# Patient Record
Sex: MALE
[De-identification: names, ages, dates, MRNs, and addresses within clinical notes are randomized per-mention and may not be internally consistent; named-entity substitution may affect disease eponyms.]

---

## 2018-05-10 ENCOUNTER — HOSPITAL ENCOUNTER (OUTPATIENT)
Dept: HOSPITAL 75 - OCC | Age: 28
Discharge: HOME | End: 2018-05-10
Attending: FAMILY MEDICINE

## 2018-05-10 PROCEDURE — 72100 X-RAY EXAM L-S SPINE 2/3 VWS: CPT

## 2018-05-10 NOTE — DIAGNOSTIC IMAGING REPORT
INDICATION: Low back pain after lifting injury.



FINDINGS: AP and lateral views of the lumbar spine show normal

vertebral body height and alignment. Disc spaces are well

maintained.



IMPRESSION: Negative lumbar spine.



Dictated by: 



  Dictated on workstation # [id number]